# Patient Record
Sex: MALE | Race: WHITE
[De-identification: names, ages, dates, MRNs, and addresses within clinical notes are randomized per-mention and may not be internally consistent; named-entity substitution may affect disease eponyms.]

---

## 2019-10-17 ENCOUNTER — HOSPITAL ENCOUNTER (OUTPATIENT)
Dept: HOSPITAL 62 - OD | Age: 69
End: 2019-10-17
Attending: INTERNAL MEDICINE
Payer: MEDICARE

## 2019-10-17 DIAGNOSIS — E78.2: ICD-10-CM

## 2019-10-17 DIAGNOSIS — Z12.5: ICD-10-CM

## 2019-10-17 DIAGNOSIS — E55.9: ICD-10-CM

## 2019-10-17 DIAGNOSIS — Z00.00: Primary | ICD-10-CM

## 2019-10-17 DIAGNOSIS — I10: ICD-10-CM

## 2019-10-17 DIAGNOSIS — E66.9: ICD-10-CM

## 2019-10-17 DIAGNOSIS — E11.9: ICD-10-CM

## 2019-10-17 LAB
ADD MANUAL DIFF: NO
ALBUMIN SERPL-MCNC: 4.4 G/DL (ref 3.5–5)
ALP SERPL-CCNC: 131 U/L (ref 38–126)
ANION GAP SERPL CALC-SCNC: 14 MMOL/L (ref 5–19)
AST SERPL-CCNC: 20 U/L (ref 17–59)
BASOPHILS # BLD AUTO: 0 10^3/UL (ref 0–0.2)
BASOPHILS NFR BLD AUTO: 0.4 % (ref 0–2)
BILIRUB DIRECT SERPL-MCNC: 0.8 MG/DL (ref 0–0.4)
BILIRUB SERPL-MCNC: 2.4 MG/DL (ref 0.2–1.3)
BUN SERPL-MCNC: 32 MG/DL (ref 7–20)
CALCIUM: 9.5 MG/DL (ref 8.4–10.2)
CHLORIDE SERPL-SCNC: 101 MMOL/L (ref 98–107)
CHOLEST SERPL-MCNC: 109.13 MG/DL (ref 0–200)
CO2 SERPL-SCNC: 28 MMOL/L (ref 22–30)
EOSINOPHIL # BLD AUTO: 0.2 10^3/UL (ref 0–0.6)
EOSINOPHIL NFR BLD AUTO: 2 % (ref 0–6)
ERYTHROCYTE [DISTWIDTH] IN BLOOD BY AUTOMATED COUNT: 15.4 % (ref 11.5–14)
FREE T4 (FREE THYROXINE): 1.1 NG/DL (ref 0.78–2.19)
GLUCOSE SERPL-MCNC: 149 MG/DL (ref 75–110)
HCT VFR BLD CALC: 37.8 % (ref 37.9–51)
HGB BLD-MCNC: 12.5 G/DL (ref 13.5–17)
LDLC SERPL DIRECT ASSAY-MCNC: 83 MG/DL (ref ?–100)
LYMPHOCYTES # BLD AUTO: 0.6 10^3/UL (ref 0.5–4.7)
LYMPHOCYTES NFR BLD AUTO: 6.6 % (ref 13–45)
MCH RBC QN AUTO: 28.2 PG (ref 27–33.4)
MCHC RBC AUTO-ENTMCNC: 32.9 G/DL (ref 32–36)
MCV RBC AUTO: 86 FL (ref 80–97)
MONOCYTES # BLD AUTO: 0.9 10^3/UL (ref 0.1–1.4)
MONOCYTES NFR BLD AUTO: 9.1 % (ref 3–13)
NEUTROPHILS # BLD AUTO: 7.9 10^3/UL (ref 1.7–8.2)
NEUTS SEG NFR BLD AUTO: 81.9 % (ref 42–78)
PLATELET # BLD: 164 10^3/UL (ref 150–450)
POTASSIUM SERPL-SCNC: 4.7 MMOL/L (ref 3.6–5)
PROT SERPL-MCNC: 8.4 G/DL (ref 6.3–8.2)
RBC # BLD AUTO: 4.42 10^6/UL (ref 4.35–5.55)
TOTAL CELLS COUNTED % (AUTO): 100 %
TRIGL SERPL-MCNC: 92 MG/DL (ref ?–150)
TSH SERPL-ACNC: 1.44 UIU/ML (ref 0.47–4.68)
VLDLC SERPL CALC-MCNC: 18 MG/DL (ref 10–31)
WBC # BLD AUTO: 9.6 10^3/UL (ref 4–10.5)

## 2019-10-17 PROCEDURE — 36415 COLL VENOUS BLD VENIPUNCTURE: CPT

## 2019-10-17 PROCEDURE — 84154 ASSAY OF PSA FREE: CPT

## 2019-10-17 PROCEDURE — 83036 HEMOGLOBIN GLYCOSYLATED A1C: CPT

## 2019-10-17 PROCEDURE — 80053 COMPREHEN METABOLIC PANEL: CPT

## 2019-10-17 PROCEDURE — 82043 UR ALBUMIN QUANTITATIVE: CPT

## 2019-10-17 PROCEDURE — 84443 ASSAY THYROID STIM HORMONE: CPT

## 2019-10-17 PROCEDURE — 80061 LIPID PANEL: CPT

## 2019-10-17 PROCEDURE — 82306 VITAMIN D 25 HYDROXY: CPT

## 2019-10-17 PROCEDURE — 82570 ASSAY OF URINE CREATININE: CPT

## 2019-10-17 PROCEDURE — 84439 ASSAY OF FREE THYROXINE: CPT

## 2019-10-17 PROCEDURE — 85025 COMPLETE CBC W/AUTO DIFF WBC: CPT

## 2019-10-18 LAB
CREAT UR-MCNC: 54.8 MG/DL
MICROALBUMIN UR-MCNC: 37.9 UG/ML
PSA FREE MFR SERPL: 18.8 %
PSA FREE SERPL-MCNC: 0.6 NG/ML
PSA SERPL-MCNC: 3.2 NG/ML (ref 0–4)

## 2020-05-07 ENCOUNTER — HOSPITAL ENCOUNTER (OUTPATIENT)
Dept: HOSPITAL 62 - OROUT | Age: 70
Discharge: HOME | End: 2020-05-07
Attending: OTOLARYNGOLOGY
Payer: MEDICARE

## 2020-05-07 VITALS — SYSTOLIC BLOOD PRESSURE: 123 MMHG | DIASTOLIC BLOOD PRESSURE: 63 MMHG

## 2020-05-07 DIAGNOSIS — I25.10: ICD-10-CM

## 2020-05-07 DIAGNOSIS — Z79.01: ICD-10-CM

## 2020-05-07 DIAGNOSIS — Z95.0: ICD-10-CM

## 2020-05-07 DIAGNOSIS — Z79.82: ICD-10-CM

## 2020-05-07 DIAGNOSIS — E10.9: ICD-10-CM

## 2020-05-07 DIAGNOSIS — C44.311: Primary | ICD-10-CM

## 2020-05-07 DIAGNOSIS — Z79.4: ICD-10-CM

## 2020-05-07 DIAGNOSIS — Z85.038: ICD-10-CM

## 2020-05-07 DIAGNOSIS — Z79.899: ICD-10-CM

## 2020-05-07 DIAGNOSIS — I25.2: ICD-10-CM

## 2020-05-07 DIAGNOSIS — C44.41: ICD-10-CM

## 2020-05-07 DIAGNOSIS — I10: ICD-10-CM

## 2020-05-07 LAB
ADD MANUAL DIFF: NO
ANION GAP SERPL CALC-SCNC: 11 MMOL/L (ref 5–19)
APTT BLD: 31.6 SEC (ref 23.5–35.8)
BASOPHILS # BLD AUTO: 0.1 10^3/UL (ref 0–0.2)
BASOPHILS NFR BLD AUTO: 1.3 % (ref 0–2)
BUN SERPL-MCNC: 29 MG/DL (ref 7–20)
CALCIUM: 9.2 MG/DL (ref 8.4–10.2)
CHLORIDE SERPL-SCNC: 102 MMOL/L (ref 98–107)
CO2 SERPL-SCNC: 26 MMOL/L (ref 22–30)
EOSINOPHIL # BLD AUTO: 0.2 10^3/UL (ref 0–0.6)
EOSINOPHIL NFR BLD AUTO: 3.4 % (ref 0–6)
ERYTHROCYTE [DISTWIDTH] IN BLOOD BY AUTOMATED COUNT: 15.8 % (ref 11.5–14)
GLUCOSE SERPL-MCNC: 177 MG/DL (ref 75–110)
GLUCOSE SERPL-MCNC: 177 MG/DL (ref 75–110)
HCT VFR BLD CALC: 34.7 % (ref 37.9–51)
HGB BLD-MCNC: 11.8 G/DL (ref 13.5–17)
INR PPP: 1.18
LYMPHOCYTES # BLD AUTO: 0.8 10^3/UL (ref 0.5–4.7)
LYMPHOCYTES NFR BLD AUTO: 14.1 % (ref 13–45)
MCH RBC QN AUTO: 28.2 PG (ref 27–33.4)
MCHC RBC AUTO-ENTMCNC: 34 G/DL (ref 32–36)
MCV RBC AUTO: 83 FL (ref 80–97)
MONOCYTES # BLD AUTO: 0.6 10^3/UL (ref 0.1–1.4)
MONOCYTES NFR BLD AUTO: 9.8 % (ref 3–13)
NEUTROPHILS # BLD AUTO: 4.1 10^3/UL (ref 1.7–8.2)
NEUTS SEG NFR BLD AUTO: 71.4 % (ref 42–78)
PLATELET # BLD: 176 10^3/UL (ref 150–450)
POTASSIUM SERPL-SCNC: 4.8 MMOL/L (ref 3.6–5)
POTASSIUM SERPL-SCNC: 4.8 MMOL/L (ref 3.6–5)
PROTHROMBIN TIME: 15.1 SEC (ref 11.4–15.4)
RBC # BLD AUTO: 4.19 10^6/UL (ref 4.35–5.55)
TOTAL CELLS COUNTED % (AUTO): 100 %
WBC # BLD AUTO: 5.7 10^3/UL (ref 4–10.5)

## 2020-05-07 PROCEDURE — 30520 REPAIR OF NASAL SEPTUM: CPT

## 2020-05-07 PROCEDURE — 15576 PEDICLE E/N/E/L/NTRORAL: CPT

## 2020-05-07 PROCEDURE — 93005 ELECTROCARDIOGRAM TRACING: CPT

## 2020-05-07 PROCEDURE — 71045 X-RAY EXAM CHEST 1 VIEW: CPT

## 2020-05-07 PROCEDURE — 80048 BASIC METABOLIC PNL TOTAL CA: CPT

## 2020-05-07 PROCEDURE — 85610 PROTHROMBIN TIME: CPT

## 2020-05-07 PROCEDURE — 82947 ASSAY GLUCOSE BLOOD QUANT: CPT

## 2020-05-07 PROCEDURE — 36415 COLL VENOUS BLD VENIPUNCTURE: CPT

## 2020-05-07 PROCEDURE — 14040 TIS TRNFR F/C/C/M/N/A/G/H/F: CPT

## 2020-05-07 PROCEDURE — 93010 ELECTROCARDIOGRAM REPORT: CPT

## 2020-05-07 PROCEDURE — 85730 THROMBOPLASTIN TIME PARTIAL: CPT

## 2020-05-07 PROCEDURE — 84132 ASSAY OF SERUM POTASSIUM: CPT

## 2020-05-07 PROCEDURE — 82962 GLUCOSE BLOOD TEST: CPT

## 2020-05-07 PROCEDURE — 85025 COMPLETE CBC W/AUTO DIFF WBC: CPT

## 2020-05-07 NOTE — EKG REPORT
SEVERITY:- ABNORMAL ECG -

VENTRICULAR-PACED RHYTHM

:

Confirmed by: Erlin Treadwell MD 07-May-2020 13:25:49

## 2020-05-07 NOTE — RADIOLOGY REPORT (SQ)
EXAM DESCRIPTION:  CHEST SINGLE VIEW



IMAGES COMPLETED DATE/TIME:  5/7/2020 8:53 am



REASON FOR STUDY:  PRE OP



COMPARISON:  6/17/2009



EXAM PARAMETERS:  NUMBER OF VIEWS: One view.

TECHNIQUE: Single frontal radiographic view of the chest acquired.

RADIATION DOSE: NA

LIMITATIONS: None.



FINDINGS:  LUNGS AND PLEURA: Chronic interstitial changes.  No evidence of pulmonary edema or pneumon
ia.

MEDIASTINUM AND HILAR STRUCTURES: No masses.  Contour normal.

HEART AND VASCULAR STRUCTURES: Cardiomegaly.  Normal vasculature.

BONES: No acute findings.

HARDWARE: CABG, prosthetic heart valve, right pacemaker.

OTHER: No other significant finding.



IMPRESSION:  NO ACUTE RADIOGRAPHIC FINDING IN THE CHEST.



TECHNICAL DOCUMENTATION:  JOB ID:  [id number]

 2011 [email or web address]- All Rights Reserved



Reading location - IP/workstation name: [email or web address]

## 2020-05-07 NOTE — OPERATIVE REPORT
Operative Report-[facility]


Operative Report: 





Date: 7 May 2020


 


History: 69-year-old male with basal cell carcinoma involving the nape of his 

neck and the left ala.  Patient underwent Mohs excision by dermatology on 05 May

2020.  This resulted in Mohs defect involving the left ala and nape and neck.  

Patient presents today for a reconstruction of the Mohs defect involving the 

nape of neck and left ala.  Informed consent was obtained from the patient


 


Pre-operative diagnosis:


1.  Basal cell carcinoma left ala


2.  Basal cell carcinoma posterior neck


3.  Mohs defect left ala subunit


4.  Mohs defect posterior neck


 


Post operative diagnosis: Same as above


 


Procedure:


1.  Nasolabial flap [CPT = 48124]


2.  Rhomboid to W-plasty advancement flap, total defect size 20 cm [CPT = 

99017]


3.  Nasal septoplasty


 


Surgeon:  Eliezer Quevedo [facility], [facility]


 


Anesthesia: GETA


 


Procedure: After receiving informed consent from the patient, was taken to a 

negative pressure room and was intubated by anesthesia.  After 20 minutes, which

is the COVID-19 protocol, the patient was taken to the operating room and then 

placed in the prone position on the operating room table for reconstruction of 

the posterior neck Mohs defect.  The posterior neck defect was oval in shape.  

The planned procedure was to do a rhomboid to W advancement flap.  Using the 

marking pen a rhomboid was drawn overlying the oval defect.  V-shaped limbs were

then marked from the midpoint of the rhomboid on either side with a V on one 

side and inverted V on the other.  This was then infiltrated with 2% lidocaine 

with 100,000 epinephrine.  Patient was then prepped and draped in a sterile 

fashion.  A 15 blade was used to incise the rhomboid defect first and then the V

limbs were incised.   Gorney scissors were used to undermine all the limbs of 

the flap.  Once all the limbs were undermined sufficiently, they were checked to

ensure that there was no tension on them.  Flaps were then transposed in a 

Z-plasty-like fashion to close the defect.  The flap was closed using 3-0 

Monocryl subcuticular suture.  Penrose drain was placed for drainage.  Penrose 

drain was secured using 4-0 nylon.  Hemostasis was obtained using bipolar 

cautery.  Dermabond, Mastisol and pressure dressing were applied.  The patient 

was then turned from the prone position to the supine position.  Attention was 

then directed to the closure of the nasal defect.  Cottonoids soaked with 4% 

cocaine were placed into each nasal cavity for approximately 5 minutes at which 

time there withdrawn the nasal septum was infiltrated 2% lidocaine 100,000 

epinephrine.  Cottonoids were replaced.  Patient was then prepped and draped in 

a sterile fashion.  15 blade was used to make a hemitransfixion incision on the 

left side.  A Crandall and the caudal elevator was then used to raise a 

mucoperichondrial mucoperiosteal flap back to the sphenoid rostrum and onto the 

nasal floor.  The osseous cartilaginous junction was  using a D knife. 

A D knife was used to make a superior incision in the quadrangular cartilage and

then a caudal incision was made leaving 2 cm of caudal cartilage.  Finally the D

knife was used to make an incision along the maxillary crest.  A Bryan elevator

was used to raise a mucoperichondrial flap on the right side freeing up the 

cartilage.  The cartilage was then removed and placed in saline.  This cartilage

is going to be used to clear the nasal defect.  The hemitransfixion incision was

closed using 4-0 chromic and then a 4-0 plain gut whipstitch used to secure the 

septal flaps.  Oh splints coated bacitracin placed into each nasal cavity and

then secured with a 2-0 Prolene.  Afrin saturated cotton was then placed into 

each nasal cavity.  Next the nasolabial crease was marked on the left side.  The

left nasal Mohs defect was irrigated with saline.  A foil was used to make a 

template of the left alar defect and this was placed along the nasolabial 

crease.  The length of the flap was determined by measuring the length of the 

defect to the pivot point.  This distance was then marked to determine placement

of the template for the nasolabial flap.  Template was traced on the nasolabial 

area with a crescent of tissue superior to the flap and a crescent shaped area 

inferior.  Shaped area inferior was to help in closure of the defect.  Next a 15

blade was used to make an incision along the nasolabial crease incorporating the

margin of the proposed flap.  Another incision was made superior.  Flap was 

raised in the subcutaneous plane superior to the facial muscles.  The the 

dissection continued superiorly towards the nose to a point where the flap was 

able to be rotated into place.  Hemostasis was obtained using bipolar cautery.  

Prior to inset of the flap the previously harvested septal cartilage was made 

into a 5 x 20 mm piece to be used to provide support of the ala.  The superior 

portion of the alar defect was undermined in the area of the dome region.  The 

cartilage graft was then placed into this pocket and along the alar margin.  

Cartilage graft was secured with 5-0 PDS and a through and through 4-0 plain gut

whipstitch.  Once the cartilage graft was in place the flap was inset into the 

defect.  Prior to inset the distal 15 mm of the flap was thinned.  The flap was 

then inset into the defect using 6-0 Prolene.  Prior to closing the nasolabial 

incision, the cheek area was undermined using blunt and sharp dissection.  

Hemostasis was obtained using bipolar cautery.  5-0 Monocryl was used as a 

dermal closure and then 6-0 Prolene was used to close the skin.  Bacitracin and 

a dressing applied.  Xeroform was placed over the exposed pedicle.  Patient was 

then given back to anesthesia who took the patient to a negative pressure room 

for extubation.  Patient was successfully extubated without complications.








Estimated blood loss: 50 mL


Fluids: 1500 mL


 


The patient was then transported to the Post Anesthesia Care Unit in stable 

condition with spontaneous respiration.  No complication.

## 2020-09-08 LAB
ANION GAP SERPL CALC-SCNC: 11 MMOL/L (ref 5–19)
BUN SERPL-MCNC: 33 MG/DL (ref 7–20)
CALCIUM: 9.7 MG/DL (ref 8.4–10.2)
CHLORIDE SERPL-SCNC: 105 MMOL/L (ref 98–107)
CO2 SERPL-SCNC: 29 MMOL/L (ref 22–30)
GLUCOSE SERPL-MCNC: 109 MG/DL (ref 75–110)
POTASSIUM SERPL-SCNC: 4.9 MMOL/L (ref 3.6–5)

## 2020-09-08 NOTE — EKG REPORT
SEVERITY:- ABNORMAL ECG -

VENTRICULAR-PACED RHYTHM

:

Confirmed by: Fredo Pina MD 08-Sep-2020 11:14:38

## 2020-09-11 ENCOUNTER — HOSPITAL ENCOUNTER (OUTPATIENT)
Dept: HOSPITAL 62 - OROUT | Age: 70
Discharge: HOME | End: 2020-09-11
Attending: OTOLARYNGOLOGY
Payer: MEDICARE

## 2020-09-11 VITALS — DIASTOLIC BLOOD PRESSURE: 87 MMHG | SYSTOLIC BLOOD PRESSURE: 145 MMHG

## 2020-09-11 DIAGNOSIS — M95.9: ICD-10-CM

## 2020-09-11 DIAGNOSIS — C44.41: ICD-10-CM

## 2020-09-11 DIAGNOSIS — I10: ICD-10-CM

## 2020-09-11 DIAGNOSIS — Z79.899: ICD-10-CM

## 2020-09-11 DIAGNOSIS — C44.311: Primary | ICD-10-CM

## 2020-09-11 DIAGNOSIS — Z03.818: ICD-10-CM

## 2020-09-11 DIAGNOSIS — E10.9: ICD-10-CM

## 2020-09-11 PROCEDURE — 15630 DELAY FLAP EYE/NOS/EAR/LIP: CPT

## 2020-09-11 PROCEDURE — 93005 ELECTROCARDIOGRAM TRACING: CPT

## 2020-09-11 PROCEDURE — 80048 BASIC METABOLIC PNL TOTAL CA: CPT

## 2020-09-11 PROCEDURE — 93010 ELECTROCARDIOGRAM REPORT: CPT

## 2020-09-11 PROCEDURE — 36415 COLL VENOUS BLD VENIPUNCTURE: CPT

## 2020-09-11 PROCEDURE — 82962 GLUCOSE BLOOD TEST: CPT

## 2020-09-11 PROCEDURE — 15260 FTH/GFT FR N/E/E/L 20 SQCM/<: CPT

## 2020-09-11 PROCEDURE — 84132 ASSAY OF SERUM POTASSIUM: CPT

## 2020-09-11 PROCEDURE — 00160 ANES PX NOSE&SINUS NOS: CPT

## 2020-09-11 PROCEDURE — C9803 HOPD COVID-19 SPEC COLLECT: HCPCS

## 2020-09-11 PROCEDURE — 87635 SARS-COV-2 COVID-19 AMP PRB: CPT

## 2020-09-11 NOTE — OPERATIVE REPORT
Operative Report[facility]


Operative Report: 





Date: 11 September 2020


 


History: 70-year-old male with history of basal cell carcinoma of the nose.  

Patient underwent reconstruction of the left ala Mohs defect using a nasolabial 

flap in May.  On 10 September 2020, the patient underwent Mohs excision of a 

basal cell carcinoma on the dorsum of the nose.  Patient presents today for 

reconstruction of the nasal dorsal Mohs defect and division and inset left 

nasolabial flap.  Informed consent was obtained from the patient.


 


Pre-operative diagnosis:


1.  Basal cell carcinoma left ala


2.  Basal cell carcinoma dorsum of the nose


 


Post operative diagnosis: Same as above


 


Procedure:


1.  Division and inset nasolabial flap, left [CPT: 76591]


2.  Repair of Mohs defect, dorsum of the nose using a Full Thickness Skin Graft 

[CPT: 91200]


 


Surgeon:  El[facility]


 


Anesthesia: General via endotracheal intubation


 


Procedure: After receiving informed consent from the patient, the patient was 

taken to the operating room placed supine on the operating table after 

successful induction and intubation by anesthesia, the nasolabial flap area was 

infiltrated 2% lidocaine 100,000 epinephrine.  The full-thickness skin graft is 

going to be harvested from the anterior neck and this area was also infiltrated 

with 2% lidocaine 100,000 epinephrine.  Patient then prepped and draped in 

sterile fashion.  Attention was directed to the division and inset of the 

nasolabial flap.  Using a 15 blade the pedicle of the nasolabial flap was 

transected and dissected towards the ala.  At this point redundant skin was 

removed and the flap was defatted.  Nasolabial flap was then inset in the left 

ala, using 7-0 Prolene.  Attention was directed to the donor site where the 

superior portion was undermined and closed using 5-0 Monocryl and 7-0 Prolene.  

Next attention was directed to the Mohs defect on the dorsum of the nose.  

Surgical marker was used to bob the margins of the resected area a Telfa pad 

was then placed over this area and the markings were imprinted on the Telfa pad 

a marking pen was then used to reinforce the markings on a Telfa and this was 

then imprinted onto the skin of the anterior neck.  The marking pen was used to 

darken the areas on the anterior neck.  A 15 blade was used to make an incision 

to remove the marked area and this is to be the full-thickness skin graft.  Once

the full-thickness skin was removed it was defatted using iris scissors.  The 

donor site was then widely undermined.  Hemostasis was obtained using bipolar 

cautery.  The wound was irrigated with copious amounts normal saline.  The 

incision was then closed using 4-0 Monocryl.  Dermabond, Mastisol,  Steri-Strips

and a pressure dressing applied.  Attention then was directed to the Mohs defect

on the dorsum of the nose.  Using a 15 blade the edges were freshened and 

pseudomembrane/eschar was removed.  The area was then irrigated with copious 

amounts normal saline.  The full-thickness skin graft was then placed over the 

Mohs defect and sutured using 5-0 Prolene and 5-0 fast gut.  The ends of the 5-0

Prolene were left long as these will be used later to secure the bolster.  

Bacitracin ointment was then placed over the full-thickness skin graft.  A 

bolster using Xeroform and a bacitracin impregnated cottonball was placed over 

the full-thickness skin graft and then secured with the previously placed 5-0 

Prolene.


The patient was then given back to anesthesia who successfully extubated the 

patient without any complications.


Estimated blood loss: 10 mL


Fluids: 1200 mL


 


The patient was then transported to the Post Anesthesia Care Unit in stable 

condition with spontaneous respiration.  No complication.